# Patient Record
Sex: MALE | Race: WHITE
[De-identification: names, ages, dates, MRNs, and addresses within clinical notes are randomized per-mention and may not be internally consistent; named-entity substitution may affect disease eponyms.]

---

## 2019-01-24 ENCOUNTER — HOSPITAL ENCOUNTER (OUTPATIENT)
Dept: HOSPITAL 89 - AMB | Age: 34
End: 2019-01-24
Payer: COMMERCIAL

## 2019-01-24 ENCOUNTER — HOSPITAL ENCOUNTER (EMERGENCY)
Dept: HOSPITAL 89 - ER | Age: 34
Discharge: ANOTHER HOSPITAL | End: 2019-01-24
Payer: COMMERCIAL

## 2019-01-24 ENCOUNTER — HOSPITAL ENCOUNTER (OUTPATIENT)
Dept: HOSPITAL 89 - AMB | Age: 34
End: 2019-01-24

## 2019-01-24 VITALS — DIASTOLIC BLOOD PRESSURE: 89 MMHG | SYSTOLIC BLOOD PRESSURE: 125 MMHG

## 2019-01-24 DIAGNOSIS — R53.1: ICD-10-CM

## 2019-01-24 DIAGNOSIS — T68.XXXA: ICD-10-CM

## 2019-01-24 DIAGNOSIS — S82.401C: ICD-10-CM
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
## 2019-01-24 DIAGNOSIS — M79.661: ICD-10-CM

## 2019-01-24 DIAGNOSIS — M79.643: ICD-10-CM
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
## 2019-01-24 DIAGNOSIS — S27.0XXA: ICD-10-CM

## 2019-01-24 DIAGNOSIS — M79.629: ICD-10-CM

## 2019-01-24 DIAGNOSIS — S22.32XA: ICD-10-CM

## 2019-01-24 DIAGNOSIS — S42.92XA: ICD-10-CM
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
## 2019-01-24 DIAGNOSIS — S81.011A: ICD-10-CM

## 2019-01-24 DIAGNOSIS — M79.662: Primary | ICD-10-CM

## 2019-01-24 DIAGNOSIS — S82.201C: Primary | ICD-10-CM

## 2019-01-24 DIAGNOSIS — Z02.9: Primary | ICD-10-CM

## 2019-01-24 DIAGNOSIS — M25.512: ICD-10-CM

## 2019-01-24 LAB
INR PPP: 1.04
PLATELET COUNT, AUTOMATED: 304 K/UL (ref 150–450)

## 2019-01-24 PROCEDURE — 82435 ASSAY OF BLOOD CHLORIDE: CPT

## 2019-01-24 PROCEDURE — 72125 CT NECK SPINE W/O DYE: CPT

## 2019-01-24 PROCEDURE — 82565 ASSAY OF CREATININE: CPT

## 2019-01-24 PROCEDURE — 86901 BLOOD TYPING SEROLOGIC RH(D): CPT

## 2019-01-24 PROCEDURE — 84450 TRANSFERASE (AST) (SGOT): CPT

## 2019-01-24 PROCEDURE — 82374 ASSAY BLOOD CARBON DIOXIDE: CPT

## 2019-01-24 PROCEDURE — 73552 X-RAY EXAM OF FEMUR 2/>: CPT

## 2019-01-24 PROCEDURE — 84132 ASSAY OF SERUM POTASSIUM: CPT

## 2019-01-24 PROCEDURE — 90471 IMMUNIZATION ADMIN: CPT

## 2019-01-24 PROCEDURE — 84460 ALANINE AMINO (ALT) (SGPT): CPT

## 2019-01-24 PROCEDURE — 90715 TDAP VACCINE 7 YRS/> IM: CPT

## 2019-01-24 PROCEDURE — 86900 BLOOD TYPING SEROLOGIC ABO: CPT

## 2019-01-24 PROCEDURE — 71045 X-RAY EXAM CHEST 1 VIEW: CPT

## 2019-01-24 PROCEDURE — 83690 ASSAY OF LIPASE: CPT

## 2019-01-24 PROCEDURE — 73030 X-RAY EXAM OF SHOULDER: CPT

## 2019-01-24 PROCEDURE — 82150 ASSAY OF AMYLASE: CPT

## 2019-01-24 PROCEDURE — 83605 ASSAY OF LACTIC ACID: CPT

## 2019-01-24 PROCEDURE — 96376 TX/PRO/DX INJ SAME DRUG ADON: CPT

## 2019-01-24 PROCEDURE — 96375 TX/PRO/DX INJ NEW DRUG ADDON: CPT

## 2019-01-24 PROCEDURE — 82040 ASSAY OF SERUM ALBUMIN: CPT

## 2019-01-24 PROCEDURE — 96365 THER/PROPH/DIAG IV INF INIT: CPT

## 2019-01-24 PROCEDURE — 72170 X-RAY EXAM OF PELVIS: CPT

## 2019-01-24 PROCEDURE — 85730 THROMBOPLASTIN TIME PARTIAL: CPT

## 2019-01-24 PROCEDURE — 99285 EMERGENCY DEPT VISIT HI MDM: CPT

## 2019-01-24 PROCEDURE — 84155 ASSAY OF PROTEIN SERUM: CPT

## 2019-01-24 PROCEDURE — 80320 DRUG SCREEN QUANTALCOHOLS: CPT

## 2019-01-24 PROCEDURE — 32551 INSERTION OF CHEST TUBE: CPT

## 2019-01-24 PROCEDURE — 81001 URINALYSIS AUTO W/SCOPE: CPT

## 2019-01-24 PROCEDURE — 82247 BILIRUBIN TOTAL: CPT

## 2019-01-24 PROCEDURE — 73562 X-RAY EXAM OF KNEE 3: CPT

## 2019-01-24 PROCEDURE — 82947 ASSAY GLUCOSE BLOOD QUANT: CPT

## 2019-01-24 PROCEDURE — 82310 ASSAY OF CALCIUM: CPT

## 2019-01-24 PROCEDURE — 71260 CT THORAX DX C+: CPT

## 2019-01-24 PROCEDURE — 84295 ASSAY OF SERUM SODIUM: CPT

## 2019-01-24 PROCEDURE — 86850 RBC ANTIBODY SCREEN: CPT

## 2019-01-24 PROCEDURE — 84075 ASSAY ALKALINE PHOSPHATASE: CPT

## 2019-01-24 PROCEDURE — 84520 ASSAY OF UREA NITROGEN: CPT

## 2019-01-24 PROCEDURE — 85610 PROTHROMBIN TIME: CPT

## 2019-01-24 PROCEDURE — 71046 X-RAY EXAM CHEST 2 VIEWS: CPT

## 2019-01-24 PROCEDURE — 96361 HYDRATE IV INFUSION ADD-ON: CPT

## 2019-01-24 PROCEDURE — 70450 CT HEAD/BRAIN W/O DYE: CPT

## 2019-01-24 PROCEDURE — 85025 COMPLETE CBC W/AUTO DIFF WBC: CPT

## 2019-01-24 PROCEDURE — 74177 CT ABD & PELVIS W/CONTRAST: CPT
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
## 2019-01-24 NOTE — RADIOLOGY IMAGING REPORT
FACILITY: [facility] 

 

PATIENT NAME: Karla Beaulieu

: 1985

MR: [id number]

V: [id number]

EXAM DATE: 

ORDERING PHYSICIAN: ANNABEL WATERS

TECHNOLOGIST: 

 

[place] [facility] [place]: Karla Beaulieu

: 1985

MRN: [id number]

Visit/Account:[id number]

Date of Sevice:  2019

 

ACCESSION #: [id number]

 

Exam type: CHEST PA LAT

 

History: chest tube

 

Comparison: CT chest abdomen pelvis performed today

 

Findings:

 

Supine AP and lateral views of the chest demonstrate interval placement of a left chest tube distal t
ip projects over the superior medial aspect the left thorax.  Previous noted left pneumothorax is no 
longer visible although study is somewhat limited due to the supine nature the study.  There are hypo
ventilatory changes in the lungs which may be related to limited inspiratory effort.  Cardiac silhoue
tte appears normal

 

IMPRESSION:

 

1.  New left-sided chest tube noted with distal tip projecting over the superior medial aspect the le
ft thorax.  Previously noted left pneumothorax which was best depicted on the CT chest abdomen and pe
lvis is no longer seen although the study is limited due to the supine nature.

 

Report Dictated By: Mer Melendrez MD at 2019 11:48 AM

 

Report E-Signed By: Mer Melendrez MD  at 2019 11:54 AM

 

WSN:[email or web address]
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
